# Patient Record
Sex: FEMALE | Race: WHITE | NOT HISPANIC OR LATINO | Employment: FULL TIME | URBAN - METROPOLITAN AREA
[De-identification: names, ages, dates, MRNs, and addresses within clinical notes are randomized per-mention and may not be internally consistent; named-entity substitution may affect disease eponyms.]

---

## 2019-12-23 ENCOUNTER — TRANSCRIBE ORDERS (OUTPATIENT)
Dept: ADMINISTRATIVE | Facility: HOSPITAL | Age: 44
End: 2019-12-23

## 2019-12-23 DIAGNOSIS — Z12.31 VISIT FOR SCREENING MAMMOGRAM: Primary | ICD-10-CM

## 2020-03-02 ENCOUNTER — APPOINTMENT (OUTPATIENT)
Dept: OTHER | Facility: HOSPITAL | Age: 45
End: 2020-03-02

## 2020-03-02 ENCOUNTER — HOSPITAL ENCOUNTER (OUTPATIENT)
Dept: MAMMOGRAPHY | Facility: HOSPITAL | Age: 45
Discharge: HOME | End: 2020-03-02
Admitting: FAMILY MEDICINE

## 2020-03-02 DIAGNOSIS — Z12.31 VISIT FOR SCREENING MAMMOGRAM: ICD-10-CM

## 2020-03-02 DIAGNOSIS — Z92.89 H/O MAMMOGRAM: ICD-10-CM

## 2020-03-02 PROCEDURE — 77067 SCR MAMMO BI INCL CAD: CPT

## 2020-03-02 PROCEDURE — 77063 BREAST TOMOSYNTHESIS BI: CPT | Performed by: RADIOLOGY

## 2020-03-02 PROCEDURE — 77067 SCR MAMMO BI INCL CAD: CPT | Performed by: RADIOLOGY

## 2020-03-02 PROCEDURE — 77063 BREAST TOMOSYNTHESIS BI: CPT

## 2020-03-17 ENCOUNTER — TRANSCRIBE ORDERS (OUTPATIENT)
Dept: MAMMOGRAPHY | Facility: HOSPITAL | Age: 45
End: 2020-03-17

## 2020-03-17 ENCOUNTER — HOSPITAL ENCOUNTER (OUTPATIENT)
Dept: ULTRASOUND IMAGING | Facility: HOSPITAL | Age: 45
Discharge: HOME | End: 2020-03-17

## 2020-03-17 ENCOUNTER — HOSPITAL ENCOUNTER (OUTPATIENT)
Dept: MAMMOGRAPHY | Facility: HOSPITAL | Age: 45
Discharge: HOME | End: 2020-03-17
Admitting: RADIOLOGY

## 2020-03-17 DIAGNOSIS — R92.8 ABNORMAL MAMMOGRAM: ICD-10-CM

## 2020-03-17 DIAGNOSIS — R92.8 ABNORMAL MAMMOGRAM: Primary | ICD-10-CM

## 2020-03-17 PROCEDURE — 77062 BREAST TOMOSYNTHESIS BI: CPT | Performed by: RADIOLOGY

## 2020-03-17 PROCEDURE — G0279 TOMOSYNTHESIS, MAMMO: HCPCS

## 2020-03-17 PROCEDURE — 77066 DX MAMMO INCL CAD BI: CPT

## 2020-03-17 PROCEDURE — 76641 ULTRASOUND BREAST COMPLETE: CPT

## 2020-03-17 PROCEDURE — 76641 ULTRASOUND BREAST COMPLETE: CPT | Performed by: RADIOLOGY

## 2020-03-17 PROCEDURE — 77066 DX MAMMO INCL CAD BI: CPT | Performed by: RADIOLOGY

## 2020-09-18 ENCOUNTER — HOSPITAL ENCOUNTER (OUTPATIENT)
Dept: MAMMOGRAPHY | Facility: HOSPITAL | Age: 45
Discharge: HOME | End: 2020-09-18

## 2020-09-18 ENCOUNTER — HOSPITAL ENCOUNTER (OUTPATIENT)
Dept: ULTRASOUND IMAGING | Facility: HOSPITAL | Age: 45
Discharge: HOME | End: 2020-09-18

## 2020-09-18 DIAGNOSIS — R92.8 ABNORMAL MAMMOGRAM: ICD-10-CM

## 2020-09-18 PROCEDURE — 76642 ULTRASOUND BREAST LIMITED: CPT | Performed by: RADIOLOGY

## 2020-09-18 PROCEDURE — 76642 ULTRASOUND BREAST LIMITED: CPT

## 2020-09-18 PROCEDURE — 77065 DX MAMMO INCL CAD UNI: CPT

## 2020-09-18 PROCEDURE — 77065 DX MAMMO INCL CAD UNI: CPT | Performed by: RADIOLOGY

## 2020-09-22 ENCOUNTER — TRANSCRIBE ORDERS (OUTPATIENT)
Dept: ADMINISTRATIVE | Facility: HOSPITAL | Age: 45
End: 2020-09-22

## 2020-09-22 DIAGNOSIS — Z12.31 VISIT FOR SCREENING MAMMOGRAM: Primary | ICD-10-CM

## 2021-03-18 ENCOUNTER — HOSPITAL ENCOUNTER (OUTPATIENT)
Dept: MAMMOGRAPHY | Facility: HOSPITAL | Age: 46
Discharge: HOME | End: 2021-03-18
Admitting: FAMILY MEDICINE

## 2021-03-18 DIAGNOSIS — Z12.31 VISIT FOR SCREENING MAMMOGRAM: ICD-10-CM

## 2021-03-18 PROCEDURE — 77063 BREAST TOMOSYNTHESIS BI: CPT

## 2021-03-18 PROCEDURE — 77063 BREAST TOMOSYNTHESIS BI: CPT | Performed by: RADIOLOGY

## 2021-03-18 PROCEDURE — 77067 SCR MAMMO BI INCL CAD: CPT

## 2021-03-18 PROCEDURE — 77067 SCR MAMMO BI INCL CAD: CPT | Performed by: RADIOLOGY

## 2022-02-07 ENCOUNTER — TRANSCRIBE ORDERS (OUTPATIENT)
Dept: ADMINISTRATIVE | Facility: HOSPITAL | Age: 47
End: 2022-02-07

## 2022-02-07 DIAGNOSIS — Z12.31 SCREENING MAMMOGRAM FOR BREAST CANCER: Primary | ICD-10-CM

## 2022-03-22 ENCOUNTER — HOSPITAL ENCOUNTER (OUTPATIENT)
Dept: MAMMOGRAPHY | Facility: HOSPITAL | Age: 47
Discharge: HOME | End: 2022-03-22
Admitting: FAMILY MEDICINE

## 2022-03-22 DIAGNOSIS — Z12.31 SCREENING MAMMOGRAM FOR BREAST CANCER: ICD-10-CM

## 2022-03-22 PROCEDURE — 77067 SCR MAMMO BI INCL CAD: CPT | Performed by: RADIOLOGY

## 2022-03-22 PROCEDURE — 77063 BREAST TOMOSYNTHESIS BI: CPT

## 2022-03-22 PROCEDURE — 77063 BREAST TOMOSYNTHESIS BI: CPT | Performed by: RADIOLOGY

## 2022-03-22 PROCEDURE — 77067 SCR MAMMO BI INCL CAD: CPT

## 2023-02-14 ENCOUNTER — TRANSCRIBE ORDERS (OUTPATIENT)
Dept: ADMINISTRATIVE | Facility: HOSPITAL | Age: 48
End: 2023-02-14
Payer: OTHER GOVERNMENT

## 2023-02-14 DIAGNOSIS — Z12.31 VISIT FOR SCREENING MAMMOGRAM: Primary | ICD-10-CM

## 2023-03-23 ENCOUNTER — HOSPITAL ENCOUNTER (OUTPATIENT)
Dept: MAMMOGRAPHY | Facility: HOSPITAL | Age: 48
Discharge: HOME | End: 2023-03-23
Admitting: FAMILY MEDICINE
Payer: OTHER GOVERNMENT

## 2023-03-23 DIAGNOSIS — Z12.31 VISIT FOR SCREENING MAMMOGRAM: ICD-10-CM

## 2023-03-23 PROCEDURE — 77063 BREAST TOMOSYNTHESIS BI: CPT

## 2023-03-23 PROCEDURE — 77063 BREAST TOMOSYNTHESIS BI: CPT | Performed by: RADIOLOGY

## 2023-03-23 PROCEDURE — 77067 SCR MAMMO BI INCL CAD: CPT

## 2023-03-23 PROCEDURE — 77067 SCR MAMMO BI INCL CAD: CPT | Performed by: RADIOLOGY

## 2023-10-09 ENCOUNTER — TRANSCRIBE ORDERS (OUTPATIENT)
Dept: ADMINISTRATIVE | Facility: HOSPITAL | Age: 48
End: 2023-10-09
Payer: OTHER GOVERNMENT

## 2023-10-09 DIAGNOSIS — N93.9 UTERINE BLEEDING: Primary | ICD-10-CM

## 2023-11-04 ENCOUNTER — HOSPITAL ENCOUNTER (OUTPATIENT)
Dept: ULTRASOUND IMAGING | Facility: HOSPITAL | Age: 48
Discharge: HOME | End: 2023-11-04
Payer: OTHER GOVERNMENT

## 2023-11-04 DIAGNOSIS — N93.9 UTERINE BLEEDING: ICD-10-CM

## 2023-11-04 PROCEDURE — 76830 TRANSVAGINAL US NON-OB: CPT

## 2023-11-08 ENCOUNTER — TELEPHONE (OUTPATIENT)
Dept: OBSTETRICS AND GYNECOLOGY | Facility: CLINIC | Age: 48
End: 2023-11-08
Payer: OTHER GOVERNMENT

## 2023-11-08 NOTE — TELEPHONE ENCOUNTER
I was instructed to schedule an us w/ a new gyn appt for fibroids. Pt wants to speak with a nurse because she stated that she already had us and wants to discuss needing a second one

## 2023-11-08 NOTE — TELEPHONE ENCOUNTER
Per MD, no repeat U/S needed. Patient was scheduled prior to obtaining U/S on 11/4. MD states what was already done is sufficient

## 2023-11-17 ENCOUNTER — TELEPHONE (OUTPATIENT)
Dept: OBSTETRICS AND GYNECOLOGY | Facility: CLINIC | Age: 48
End: 2023-11-17
Payer: OTHER GOVERNMENT

## 2023-11-20 ENCOUNTER — OFFICE VISIT (OUTPATIENT)
Dept: OBSTETRICS AND GYNECOLOGY | Facility: CLINIC | Age: 48
End: 2023-11-20
Payer: OTHER GOVERNMENT

## 2023-11-20 VITALS
HEIGHT: 65 IN | BODY MASS INDEX: 29.62 KG/M2 | SYSTOLIC BLOOD PRESSURE: 132 MMHG | WEIGHT: 177.8 LBS | DIASTOLIC BLOOD PRESSURE: 86 MMHG

## 2023-11-20 DIAGNOSIS — D25.1 INTRAMURAL LEIOMYOMA OF UTERUS: ICD-10-CM

## 2023-11-20 DIAGNOSIS — N93.9 ABNORMAL UTERINE BLEEDING (AUB): Primary | ICD-10-CM

## 2023-11-20 RX ORDER — EPINEPHRINE 0.3 MG/.3ML
INJECTION SUBCUTANEOUS
COMMUNITY
Start: 2023-08-10

## 2023-11-20 RX ORDER — LEVOCETIRIZINE DIHYDROCHLORIDE 5 MG/1
TABLET, FILM COATED ORAL
COMMUNITY
Start: 2023-11-08

## 2023-11-20 NOTE — ASSESSMENT & PLAN NOTE
We discussed that fibroids are benign tumors of the uterus. Most of the time women have them and don't even know they do. Our general thought is that if they aren't bothering her, we won't bother them. However, if they start to cause issues with bleeding, pain, or a bulky abdomen we can explore treatment options. We discussed that in women who wish to retain their ability to get pregnant treatment options include: hormonal contraception, hormonal IUD, DepoProvera, TXA, Myomectomy (abdominal or hysteroscopic depending on location). In those women who no longer desire fertility treatment options include the above, but also a uterine fibroid embolization, or hysterectomy. She does not desire to maintain her fertility.     After review of the options she is leaning toward observation as she is not bothered by her fibroids other than that one bleeding episode.     We had an extensive discussion about the fact that one cannot reliably distinguish a fibroid from leiomyosarcoma based on any imaging modality. Fibroids are very common and leiomyosarcomas of the uterus are exceedingly rare. Rapid enlargement of a fibroid is not a reliable sign of it being a leiomyosarcoma. Most uterine tumors that enlarge rapidly are fibroids, because fibroids are just that much more common than leiomyosarcomas.     Given the plan for observation, we will plan on getting an ultrasound in 4 to 6 months.

## 2023-11-20 NOTE — ASSESSMENT & PLAN NOTE
Counseled on the need for an endometrial biopsy.  Patient is currently on her period, recommend returning in 1 to 2 weeks for endometrial biopsy while not on her cycle.

## 2023-11-20 NOTE — PROGRESS NOTES
"          Chief Complaint   Patient presents with    Menorrhagia         Subjective   HPI  Claudinelinda Coello is a 48 y.o. female, , Patient's last menstrual period was 2023 (exact date). She presents for initial evaluation of menorrhagia with regular cycles. She reports approximately 2 months ago, she had a heavy menstrual wherein she saturated a super tampon hourly for 3 days (with clots). Her menstruals since that instance that started in September have been normal flow. Her typical menstruals are moderate in flow (with clots) without dysmenorrhea. They typically last 4-6 days. She has begun feeling intermittent hot flashes.     Her PCP performed an U/S and labwork, considering patient's mother's history of uterine cancer (dx in her mid-to-late 40s); labwork showed anemia, which she is taking iron for. The patient reports additional symptoms as  nothing .      US was not done today, but she had an U/S 2023 which showed \"normal uterine parenchymal echogenicity with a 3.4 cm right anterior fundal intramural fibroid. The endometrial stripe measures 6 mm, normal\"    Thromboembolic Disease: none  History of hypertension: no  History of migraines: no  Tobacco Usage?: No     Additional OB/GYN History   Last Pap:   Last Completed Pap Smear            PAP SMEAR (Every 3 Years) Next due on 3/22/2025      2022  Done - normal, per patient                      Current Outpatient Medications:     ALLERGY SERUM INJECTION, Inject  under the skin into the appropriate area as directed 1 (One) Time., Disp: , Rfl:     EPINEPHrine (EPIPEN) 0.3 MG/0.3ML solution auto-injector injection, , Disp: , Rfl:     Ferrous Sulfate (IRON PO), Take  by mouth., Disp: , Rfl:     levocetirizine (XYZAL) 5 MG tablet, , Disp: , Rfl:      Past Medical History:   Diagnosis Date    Anemia 10/2023    Basal cell carcinoma     skin spots    History of varicella as a child     Lumbar back pain     Ovarian cyst     Discovered " "fluid filled cyst during 1st trimester exam    Uterine fibroid 11/04/2023    3.4 cm right anterior fundal intramural fibroid        Past Surgical History:   Procedure Laterality Date    MANDIBLE SURGERY  2016    and upper jaw    TUBAL ABDOMINAL LIGATION Bilateral     WISDOM TOOTH EXTRACTION           The additional following portions of the patient's history were reviewed and updated as appropriate: allergies, current medications, past family history, past medical history, past social history, past surgical history, and problem list.    Review of Systems   Constitutional: Negative.    HENT: Negative.     Eyes: Negative.    Respiratory: Negative.     Cardiovascular: Negative.    Gastrointestinal: Negative.    Endocrine: Negative.    Genitourinary:  Positive for menstrual problem.   Musculoskeletal: Negative.    Skin: Negative.    Allergic/Immunologic: Negative.    Neurological: Negative.    Hematological: Negative.    Psychiatric/Behavioral: Negative.         I have reviewed and agree with the HPI, ROS, and historical information as entered above. Db Wilkinson MD     Objective   /86 (BP Location: Right arm, Patient Position: Sitting, Cuff Size: Adult)   Ht 165.1 cm (65\")   Wt 80.6 kg (177 lb 12.8 oz)   LMP 11/20/2023 (Exact Date)   BMI 29.59 kg/m²     Physical Exam  Constitutional:       Appearance: Normal appearance.   HENT:      Head: Normocephalic and atraumatic.   Pulmonary:      Effort: Pulmonary effort is normal.   Neurological:      General: No focal deficit present.      Mental Status: She is alert.   Psychiatric:         Mood and Affect: Mood normal.         Assessment & Plan          Problem List Items Addressed This Visit       Intramural leiomyoma of uterus    Current Assessment & Plan     We discussed that fibroids are benign tumors of the uterus. Most of the time women have them and don't even know they do. Our general thought is that if they aren't bothering her, we won't bother them. " However, if they start to cause issues with bleeding, pain, or a bulky abdomen we can explore treatment options. We discussed that in women who wish to retain their ability to get pregnant treatment options include: hormonal contraception, hormonal IUD, DepoProvera, TXA, Myomectomy (abdominal or hysteroscopic depending on location). In those women who no longer desire fertility treatment options include the above, but also a uterine fibroid embolization, or hysterectomy. She does not desire to maintain her fertility.     After review of the options she is leaning toward observation as she is not bothered by her fibroids other than that one bleeding episode.     We had an extensive discussion about the fact that one cannot reliably distinguish a fibroid from leiomyosarcoma based on any imaging modality. Fibroids are very common and leiomyosarcomas of the uterus are exceedingly rare. Rapid enlargement of a fibroid is not a reliable sign of it being a leiomyosarcoma. Most uterine tumors that enlarge rapidly are fibroids, because fibroids are just that much more common than leiomyosarcomas.     Given the plan for observation, we will plan on getting an ultrasound in 4 to 6 months.         Relevant Orders    US Non-ob Transvaginal    Abnormal uterine bleeding (AUB) - Primary    Current Assessment & Plan     Counseled on the need for an endometrial biopsy.  Patient is currently on her period, recommend returning in 1 to 2 weeks for endometrial biopsy while not on her cycle.         Relevant Orders    US Non-ob Transvaginal            Db Wilkinson MD  11/20/2023

## 2023-11-27 ENCOUNTER — PROCEDURE VISIT (OUTPATIENT)
Dept: OBSTETRICS AND GYNECOLOGY | Facility: CLINIC | Age: 48
End: 2023-11-27
Payer: OTHER GOVERNMENT

## 2023-11-27 VITALS
DIASTOLIC BLOOD PRESSURE: 80 MMHG | BODY MASS INDEX: 29.59 KG/M2 | SYSTOLIC BLOOD PRESSURE: 118 MMHG | HEIGHT: 65 IN | WEIGHT: 177.6 LBS

## 2023-11-27 DIAGNOSIS — N93.9 ABNORMAL UTERINE BLEEDING (AUB): ICD-10-CM

## 2023-11-27 DIAGNOSIS — Z76.89 ENCOUNTER FOR BIOPSY: Primary | ICD-10-CM

## 2023-11-27 DIAGNOSIS — D25.1 INTRAMURAL LEIOMYOMA OF UTERUS: ICD-10-CM

## 2023-11-27 LAB
B-HCG UR QL: NEGATIVE
EXPIRATION DATE: NORMAL
INTERNAL NEGATIVE CONTROL: NORMAL
INTERNAL POSITIVE CONTROL: NORMAL
Lab: NORMAL

## 2023-11-27 NOTE — PROGRESS NOTES
Gynecologic Procedure Note        Endometrial Biopsy with Dilation     Indications: Claudine Coello is a 48 y.o. , who presents today for endometrial biopsy.  The patient was noted to have Menorrhagia.  Her LMP is Patient's last menstrual period was 2023 (exact date). . After being presented with the risk, benefits, and specific detail of the procedure, the patient wished to proceed.  Written consent was obtained from patient.   Urine pregnancy test was Negative. Patient does not have an allergy to betadine or shellfish.     Procedure Details     Time out: immediate members of the procedure team and patient agree to the following: correct patient, correct site, correct procedure to be performed. Db Wilkinson MD     The patient was placed on the table in the dorsal lithotomy position.  She was draped in the appropriate manner.  A speculum was placed in the vagina.  The cervix was visualized and prepped with Betadine.  A tenaculum was placed on the anterior lip of the cervix for traction.  A small plastic 5 mm Pipelle syringe curette was inserted into the cervical canal.  This was unable to be advanced past the internal os.  Despite use of Newell dilators, I was unable to safely dilate the cervix to complete the procedure.  The procedure was aborted at this time.  The patient tolerated the procedure very well and she reported mild cramping.  The tenaculum was removed from the cervix and the speculum was removed.  Bimanual exam revealed a 1 cm dilated external os, but a firm, stenotic, cervical canal.         Complications: Failed procedure.     Procedures    Review of Systems   Constitutional: Negative.    HENT: Negative.     Eyes: Negative.    Respiratory: Negative.     Cardiovascular: Negative.    Gastrointestinal: Negative.    Endocrine: Negative.    Genitourinary:  Positive for menstrual problem and vaginal bleeding.   Musculoskeletal: Negative.    Skin: Negative.    Allergic/Immunologic:  "Negative.    Neurological: Negative.    Hematological: Negative.    Psychiatric/Behavioral: Negative.       /80   Ht 165.1 cm (65\")   Wt 80.6 kg (177 lb 9.6 oz)   LMP 11/20/2023 (Exact Date)   BMI 29.55 kg/m²       Plan:  Orders Placed This Encounter   Procedures    POC Pregnancy, Urine     Order Specific Question:   Release to patient     Answer:   Routine Release [[id number]]       Problem List Items Addressed This Visit       Intramural leiomyoma of uterus    Abnormal uterine bleeding (AUB)     Other Visit Diagnoses       Encounter for biopsy    -  Primary    Relevant Orders    POC Pregnancy, Urine (Completed)                Instructions  Given the patient's only had 1 episode of abnormal bleeding, it is reasonable to continue to observe at this time.  I offered her the option of upfront D&C hysteroscopy operating room, likely with ultrasound guidance.  We came to the mutual decision to observe at this time.  She will let us know if her abnormal bleeding recurs.  Otherwise, we will plan on a repeat ultrasound in 4 months as previously planned.  Patient instructed to call the office if develops a fever of 100.4 or greater, vaginal bleeding heavier than a period, foul vaginal discharge or pain.     Db iWlkinson MD  11/27/2023  "

## 2024-02-07 ENCOUNTER — TRANSCRIBE ORDERS (OUTPATIENT)
Dept: ADMINISTRATIVE | Facility: HOSPITAL | Age: 49
End: 2024-02-07
Payer: OTHER GOVERNMENT

## 2024-02-07 DIAGNOSIS — Z12.31 VISIT FOR SCREENING MAMMOGRAM: Primary | ICD-10-CM

## 2024-03-25 ENCOUNTER — HOSPITAL ENCOUNTER (OUTPATIENT)
Dept: MAMMOGRAPHY | Facility: HOSPITAL | Age: 49
Discharge: HOME | End: 2024-03-25
Admitting: FAMILY MEDICINE
Payer: OTHER GOVERNMENT

## 2024-03-25 DIAGNOSIS — Z12.31 VISIT FOR SCREENING MAMMOGRAM: ICD-10-CM

## 2024-03-25 PROCEDURE — 77067 SCR MAMMO BI INCL CAD: CPT

## 2024-03-25 PROCEDURE — 77063 BREAST TOMOSYNTHESIS BI: CPT

## 2024-04-26 ENCOUNTER — HOSPITAL ENCOUNTER (OUTPATIENT)
Dept: MAMMOGRAPHY | Facility: HOSPITAL | Age: 49
Discharge: HOME | End: 2024-04-26
Admitting: RADIOLOGY
Payer: OTHER GOVERNMENT

## 2024-04-26 DIAGNOSIS — R92.8 ABNORMAL MAMMOGRAM: ICD-10-CM

## 2024-04-26 PROCEDURE — G0279 TOMOSYNTHESIS, MAMMO: HCPCS

## 2024-04-26 PROCEDURE — 77066 DX MAMMO INCL CAD BI: CPT

## 2024-05-24 ENCOUNTER — OFFICE VISIT (OUTPATIENT)
Dept: OBSTETRICS AND GYNECOLOGY | Facility: CLINIC | Age: 49
End: 2024-05-24
Payer: OTHER GOVERNMENT

## 2024-05-24 VITALS
DIASTOLIC BLOOD PRESSURE: 84 MMHG | BODY MASS INDEX: 30.49 KG/M2 | HEIGHT: 65 IN | WEIGHT: 183 LBS | SYSTOLIC BLOOD PRESSURE: 138 MMHG

## 2024-05-24 DIAGNOSIS — N83.202 LEFT OVARIAN CYST: ICD-10-CM

## 2024-05-24 DIAGNOSIS — D25.1 INTRAMURAL LEIOMYOMA OF UTERUS: Primary | ICD-10-CM

## 2024-05-24 NOTE — PROGRESS NOTES
Chief Complaint   Patient presents with    Follow-up    Fibroids         Subjective   HPI  Claudine Coello is a 49 y.o. female, , who presents for follow up evaluation of fibroid and AUB.    Her last LMP was Patient's last menstrual period was 2024 (exact date)..  She was last seen 6 month(s) ago. At that time the plan was expectant management for management of the fibroid(s) and AUB. She normally has her periods regularly every 28 days, lasting 5 days. She reports normal period flow is moderate on day 3 and 4.     Since her last visit her symptoms have remained unchanged. She reports she had normal periods since her November appointment up until 24. On 24 she started bleeding and it lasted until yesterday 24. She reports the flow was spotty the entire time. The patient reports additional symptoms as none.      Did the patient have u/s today? Yes    Tobacco Usage?: No    Additional OB/GYN History   Last Pap : 3/20/22  Last Completed Pap Smear            PAP SMEAR (Every 3 Years) Next due on 3/22/2025      2022  Done - normal, per patient                      Current Outpatient Medications:     ALLERGY SERUM INJECTION, Inject  under the skin into the appropriate area as directed 1 (One) Time., Disp: , Rfl:     EPINEPHrine (EPIPEN) 0.3 MG/0.3ML solution auto-injector injection, , Disp: , Rfl:     levocetirizine (XYZAL) 5 MG tablet, , Disp: , Rfl:      Past Medical History:   Diagnosis Date    Anemia 10/2023    Basal cell carcinoma     skin spots    History of varicella as a child     Lumbar back pain     Multiple gestation     Ovarian cyst     Discovered fluid filled cyst during 1st trimester exam    Urinary tract infection 2004    Twice in     Uterine fibroid 2023    3.4 cm right anterior fundal intramural fibroid    Varicella     As a child        Past Surgical History:   Procedure Laterality Date    MANDIBLE SURGERY  2016    and upper jaw    TUBAL  "ABDOMINAL LIGATION Bilateral     WISDOM TOOTH EXTRACTION         The additional following portions of the patient's history were reviewed and updated as appropriate: allergies, current medications, past family history, past medical history, past social history, past surgical history, and problem list.    Review of Systems   Constitutional: Negative.    HENT: Negative.     Eyes: Negative.    Respiratory: Negative.     Cardiovascular: Negative.    Gastrointestinal: Negative.    Endocrine: Negative.    Genitourinary:  Positive for menstrual problem.   Musculoskeletal: Negative.    Skin: Negative.    Allergic/Immunologic: Negative.    Neurological: Negative.    Hematological: Negative.    Psychiatric/Behavioral: Negative.       All other systems reviewed and are negative.     I have reviewed and agree with the HPI, ROS, and historical information as entered above. Db Wilkinson MD     /84   Ht 165.1 cm (65\")   Wt 83 kg (183 lb)   LMP 05/13/2024 (Exact Date)   BMI 30.45 kg/m²     Physical Exam  Constitutional:       Appearance: Normal appearance.   HENT:      Head: Normocephalic and atraumatic.   Pulmonary:      Effort: Pulmonary effort is normal.   Neurological:      General: No focal deficit present.      Mental Status: She is alert.   Psychiatric:         Mood and Affect: Mood normal.         Assessment & Plan     Assessment and Plan    Problem List Items Addressed This Visit       Intramural leiomyoma of uterus - Primary    Current Assessment & Plan     Significantly smaller. Continue expectant management.          Left ovarian cyst    Current Assessment & Plan     Simplecyst. Follow up 1 year per patient request instead of 6 months.            Continue expectant management. Reviewed menopausal transition  Call with heavy bleeding    Return in about 1 year (around 5/24/2025) for GYN Follow up with [facility].       Db Wilkinson MD  05/24/2024   "

## 2025-01-14 ENCOUNTER — TRANSCRIBE ORDERS (OUTPATIENT)
Dept: ADMINISTRATIVE | Facility: HOSPITAL | Age: 50
End: 2025-01-14
Payer: OTHER GOVERNMENT

## 2025-01-14 DIAGNOSIS — M25.561 CHRONIC PAIN OF RIGHT KNEE: Primary | ICD-10-CM

## 2025-01-14 DIAGNOSIS — G89.29 CHRONIC PAIN OF RIGHT KNEE: Primary | ICD-10-CM

## 2025-02-12 ENCOUNTER — TRANSCRIBE ORDERS (OUTPATIENT)
Dept: ADMINISTRATIVE | Facility: HOSPITAL | Age: 50
End: 2025-02-12
Payer: OTHER GOVERNMENT

## 2025-02-12 DIAGNOSIS — Z12.31 VISIT FOR SCREENING MAMMOGRAM: Primary | ICD-10-CM

## 2025-02-17 ENCOUNTER — HOSPITAL ENCOUNTER (OUTPATIENT)
Dept: MRI IMAGING | Facility: HOSPITAL | Age: 50
Discharge: HOME | End: 2025-02-17
Admitting: NURSE PRACTITIONER
Payer: OTHER GOVERNMENT

## 2025-02-17 DIAGNOSIS — G89.29 CHRONIC PAIN OF RIGHT KNEE: ICD-10-CM

## 2025-02-17 DIAGNOSIS — M25.561 CHRONIC PAIN OF RIGHT KNEE: ICD-10-CM

## 2025-02-17 PROCEDURE — 73721 MRI JNT OF LWR EXTRE W/O DYE: CPT

## 2025-02-24 ENCOUNTER — OFFICE VISIT (OUTPATIENT)
Dept: ORTHOPEDIC SURGERY | Facility: CLINIC | Age: 50
End: 2025-02-24
Payer: OTHER GOVERNMENT

## 2025-02-24 VITALS
WEIGHT: 174.2 LBS | BODY MASS INDEX: 29.02 KG/M2 | DIASTOLIC BLOOD PRESSURE: 70 MMHG | HEIGHT: 65 IN | SYSTOLIC BLOOD PRESSURE: 126 MMHG

## 2025-02-24 DIAGNOSIS — M22.41 CHONDROMALACIA OF RIGHT PATELLA: Primary | ICD-10-CM

## 2025-02-24 PROCEDURE — 99203 OFFICE O/P NEW LOW 30 MIN: CPT | Performed by: ORTHOPAEDIC SURGERY

## 2025-02-24 RX ORDER — FERROUS SULFATE 325(65) MG
1 TABLET ORAL
COMMUNITY
Start: 2024-12-09

## 2025-02-24 NOTE — PROGRESS NOTES
[facility] Note    Subjective     Chief Complaint   Patient presents with   • Right Knee - Pain        HPI    Claudine Coello is a 49 y.o. female who presents with new problem of: right knee pain.  Onset: twisting injury. The issue has been ongoing for 2 month(s). Pain is a 4/10 on the pain scale. Pain is described as throbbing. Associated symptoms include pain, swelling, popping, grinding, and stiffness. The pain is worse with walking, climbing stairs, and leisure; resting, sitting, ice, and pain medication and/or NSAID improve the pain. Previous treatments have included: NSAIDS.  She was getting into a seat on a van, and twisted her knee in the process, and had throbbing afterwards.  No swelling.    I have reviewed the following portions of the patient's history and agree with: History of Present Illness and Review of Systems    Patient Active Problem List   Diagnosis   • Intramural leiomyoma of uterus   • Abnormal uterine bleeding (AUB)   • Left ovarian cyst     Past Medical History:   Diagnosis Date   • Anemia 10/2023   • Basal cell carcinoma     skin spots   • History of varicella as a child    • Lumbar back pain    • Lumbosacral disc disease    • Multiple gestation    • Ovarian cyst     Discovered fluid filled cyst during 1st trimester exam   • Urinary tract infection 2004    Twice in    • Uterine fibroid 2023    3.4 cm right anterior fundal intramural fibroid   • Varicella     As a child      Past Surgical History:   Procedure Laterality Date   • MANDIBLE SURGERY      and upper jaw   • TUBAL ABDOMINAL LIGATION Bilateral    • WISDOM TOOTH EXTRACTION        Family History   Problem Relation Age of Onset   • Hypertension Father    • Coronary artery disease Father         Twice heart surgery,  of heart attack    • Heart attack Father    • Diabetes type II Father    • Hypertension Mother    • Coronary artery disease Mother         Stints procedure    •  Heart attack Mother    • Uterine cancer Mother         Hysterectomy    • Diabetes type II Mother    • Colon polyps Brother    • Breast cancer Neg Hx    • Ovarian cancer Neg Hx    • Colon cancer Neg Hx      Social History     Socioeconomic History   • Marital status:    Tobacco Use   • Smoking status: Former     Current packs/day: 0.00     Types: Cigarettes     Start date: 1994     Quit date: 1998     Years since quittin.2   • Smokeless tobacco: Never   • Tobacco comments:     social   Vaping Use   • Vaping status: Never Used   Substance and Sexual Activity   • Alcohol use: Yes     Alcohol/week: 1.0 standard drink of alcohol     Types: 1 Glasses of wine per week   • Drug use: Never   • Sexual activity: Yes     Partners: Male     Birth control/protection: Tubal ligation      Current Outpatient Medications on File Prior to Visit   Medication Sig Dispense Refill   • ferrous sulfate 325 (65 FE) MG tablet Take 1 tablet by mouth Every Other Day.     • ALLERGY SERUM INJECTION Inject  under the skin into the appropriate area as directed 1 (One) Time.     • EPINEPHrine (EPIPEN) 0.3 MG/0.3ML solution auto-injector injection      • levocetirizine (XYZAL) 5 MG tablet        No current facility-administered medications on file prior to visit.      No Known Allergies     Review of Systems   Constitutional:  Negative for activity change, appetite change, chills, diaphoresis, fatigue, fever and unexpected weight change.   HENT:  Negative for congestion, dental problem, drooling, ear discharge, ear pain, facial swelling, hearing loss, mouth sores, nosebleeds, postnasal drip, rhinorrhea, sinus pressure, sneezing, sore throat, tinnitus, trouble swallowing and voice change.    Eyes:  Negative for photophobia, pain, discharge, redness, itching and visual disturbance.   Respiratory:  Negative for apnea, cough, choking, chest tightness, shortness of breath, wheezing and stridor.    Cardiovascular:  Negative for  "chest pain, palpitations and leg swelling.   Gastrointestinal:  Negative for abdominal distention, abdominal pain, anal bleeding, blood in stool, constipation, diarrhea, nausea, rectal pain and vomiting.   Endocrine: Negative for cold intolerance, heat intolerance, polydipsia, polyphagia and polyuria.   Genitourinary:  Negative for decreased urine volume, difficulty urinating, dysuria, enuresis, flank pain, frequency, genital sores, hematuria and urgency.   Musculoskeletal:  Positive for arthralgias. Negative for back pain, gait problem, joint swelling, myalgias, neck pain and neck stiffness.   Skin:  Negative for color change, pallor, rash and wound.   Allergic/Immunologic: Negative for environmental allergies, food allergies and immunocompromised state.   Neurological:  Negative for dizziness, tremors, seizures, syncope, facial asymmetry, speech difficulty, weakness, light-headedness, numbness and headaches.   Hematological:  Negative for adenopathy. Does not bruise/bleed easily.   Psychiatric/Behavioral:  Negative for agitation, behavioral problems, confusion, decreased concentration, dysphoric mood, hallucinations, self-injury, sleep disturbance and suicidal ideas. The patient is not nervous/anxious and is not hyperactive.         Objective      Physical Exam  /70   Ht 165.5 cm (65.16\")   Wt 79 kg (174 lb 3.2 oz)   BMI 28.85 kg/m²     Body mass index is 28.85 kg/m².  BMI is >= 30 and <35. (Class 1 Obesity). The following options were offered after discussion;: referral to primary care        General:   Mental Status:  Alert   Appearance: Cooperative, in no acute distress   Build and Nutrition: Well-nourished well-developed female   Orientation: Alert and oriented to person, place and time   Posture: Normal   Gait: Nonantalgic    Integument:   Right knee: No skin lesions, no rash, no ecchymosis    Neurologic:   Sensation:    Right foot: Intact to light touch on the dorsal and plantar " aspect   Motor:  Right lower extremity: 5/5 quadriceps, hamstrings, ankle dorsiflexors, and ankle plantar flexors    Vascular:   Right lower extremity: 2+ dorsalis pedis pulse, prompt capillary refill    Lower Extremities:   Right Knee:    Tenderness:  No medial/lateral joint line tenderness    Effusion:  None    Swelling:  None    Crepitus:  Positive    Atrophy:  None    Range of motion:  Extension: 0°       Flexion: 130°  Instability:  No varus laxity, no valgus laxity, negative anterior drawer  Deformities:  None      Imaging/Studies      Imaging Results (Last 24 Hours)       Procedure Component Value Units Date/Time    XR Knee 4+ View Right [[id number]] Resulted: 02/24/25 1558     Updated: 02/24/25 1558    Narrative:      Right Knee Radiographs  Indication: right knee pain  Views: Standing AP's and skiers of both knees, with lateral and sunrise   views of the right knee    Comparison: no prior studies available    Findings:    No acute bony abnormalities.  Good alignment.  No unusual bony features.            MRI KNEE RIGHT  WO CONTRAST     Date of Exam: 2/17/2025 2:19 PM EST     Indication: M25.561.     Comparison: None available.     Technique:  Routine multiplanar/multisequence images of the right knee were obtained without contrast administration.        Findings:  The medial and lateral menisci are normal. The medial and lateral supporting structures are normal. The articular cartilage of the medial and lateral femorotibial compartments appears grossly preserved.     The anterior cruciate ligament and posterior cruciate ligament are normal.     The quadriceps tendon and patellar tendon are normal. No edema of the anterior knee fat pads. There is chondromalacia and areas of low to moderate-grade chondral loss and fissuring of the patellar articular cartilage. There is chondromalacia and   fissuring of the central trochlear cartilage.     There is a physiologic amount of knee joint fluid. No popliteal cyst.  The posterior knee neurovasculature is unremarkable. Normal appearance of the muscles and subcutaneous tissues.     No focal bony lesion. No fracture or bony contusion.     IMPRESSION:  Impression:  Moderate-grade chondral loss/chondral irregularities of the patellar articular cartilage. Chondromalacia and fissuring of the central trochlear cartilage.     Otherwise unremarkable knee MRI.           Electronically Signed: Bijan Blanton MD    2/17/2025 4:24 PM EST    Workstation ID: [id number]    I reviewed the above imaging, and agree with findings.    Assessment and Plan     Diagnoses and all orders for this visit:    1. Chondromalacia of right patella (Primary)  -     XR Knee 4+ View Right  -     Ambulatory Referral to Physical Therapy for Evaluation & Treatment        1. Chondromalacia of right patella          I reviewed my findings with the patient.  She does have chondromalacia seen on the MRI on her patella.  A course of physical therapy was recommended, referral provided.  I will see her back if she has any worsening or problems in the future.  No surgical intervention recommended.    Return if symptoms worsen or fail to improve.      Mark Berger MD  02/24/25  16:13 EST      Dictated Utilizing [employer]

## 2025-03-24 LAB
NCCN CRITERIA FLAG: NORMAL
TYRER CUZICK SCORE: 8.5

## 2025-04-08 ENCOUNTER — HOSPITAL ENCOUNTER (OUTPATIENT)
Dept: MAMMOGRAPHY | Facility: HOSPITAL | Age: 50
Discharge: HOME | End: 2025-04-08
Admitting: FAMILY MEDICINE
Payer: OTHER GOVERNMENT

## 2025-04-08 DIAGNOSIS — Z12.31 VISIT FOR SCREENING MAMMOGRAM: ICD-10-CM

## 2025-04-08 PROCEDURE — 77063 BREAST TOMOSYNTHESIS BI: CPT

## 2025-04-08 PROCEDURE — 77067 SCR MAMMO BI INCL CAD: CPT

## 2025-04-30 ENCOUNTER — OFFICE VISIT (OUTPATIENT)
Dept: ORTHOPEDIC SURGERY | Facility: CLINIC | Age: 50
End: 2025-04-30
Payer: OTHER GOVERNMENT

## 2025-04-30 VITALS
BODY MASS INDEX: 29.66 KG/M2 | HEIGHT: 65 IN | WEIGHT: 178 LBS | DIASTOLIC BLOOD PRESSURE: 70 MMHG | SYSTOLIC BLOOD PRESSURE: 126 MMHG

## 2025-04-30 DIAGNOSIS — M25.551 RIGHT HIP PAIN: Primary | ICD-10-CM

## 2025-04-30 PROCEDURE — 99214 OFFICE O/P EST MOD 30 MIN: CPT | Performed by: ORTHOPAEDIC SURGERY

## 2025-04-30 RX ORDER — CYCLOBENZAPRINE HCL 5 MG
TABLET ORAL
COMMUNITY
Start: 2025-04-08

## 2025-04-30 NOTE — PROGRESS NOTES
[facility] [place] [facility] Note    Subjective     Chief Complaint   Patient presents with    Right Hip - Initial Evaluation        HPI    Claudine Coello is a 50 y.o. female who presents with new problem of: right hip pain.  Onset: atraumatic and gradual in nature. The issue has been ongoing for 1 month(s). Pain is a 8/10 on the pain scale. Pain is described as burning. Associated symptoms include pain. The pain is worse with walking; resting improve the pain. Previous treatments have included: NSAIDS, physical therapy, and flexerill.  Pain is located on the lateral aspect of the hip, and can radiate down to the knee.  No numbness or tingling.  Known history of a back issue with herniated disc.    I have reviewed the following portions of the patient's history and agree with: History of Present Illness and Review of Systems    Patient Active Problem List   Diagnosis    Intramural leiomyoma of uterus    Abnormal uterine bleeding (AUB)    Left ovarian cyst     Past Medical History:   Diagnosis Date    Anemia 10/2023    Basal cell carcinoma     skin spots    History of varicella as a child     Lumbar back pain     Lumbosacral disc disease     Multiple gestation     Ovarian cyst     Discovered fluid filled cyst during 1st trimester exam    Urinary tract infection 2004    Twice in     Uterine fibroid 2023    3.4 cm right anterior fundal intramural fibroid    Varicella 1980    As a child      Past Surgical History:   Procedure Laterality Date    MANDIBLE SURGERY  2016    and upper jaw    TUBAL ABDOMINAL LIGATION Bilateral     WISDOM TOOTH EXTRACTION        Family History   Problem Relation Age of Onset    Hypertension Father     Coronary artery disease Father         Twice heart surgery,  of heart attack 2019    Heart attack Father     Diabetes type II Father     Diabetes Father     Hypertension Mother     Coronary artery disease Mother         Stints procedure     Heart attack Mother      Uterine cancer Mother         Hysterectomy 2003    Diabetes type II Mother     Diabetes Mother     Colon polyps Brother     Breast cancer Neg Hx     Ovarian cancer Neg Hx     Colon cancer Neg Hx      Social History     Socioeconomic History    Marital status:    Tobacco Use    Smoking status: Former     Current packs/day: 0.00     Types: Cigarettes     Start date: 1994     Quit date: 1998     Years since quittin.4    Smokeless tobacco: Never    Tobacco comments:     social   Vaping Use    Vaping status: Never Used   Substance and Sexual Activity    Alcohol use: Yes     Alcohol/week: 1.0 standard drink of alcohol     Types: 1 Glasses of wine per week    Drug use: Never    Sexual activity: Yes     Partners: Male     Birth control/protection: Tubal ligation      Current Outpatient Medications on File Prior to Visit   Medication Sig Dispense Refill    ALLERGY SERUM INJECTION Inject  under the skin into the appropriate area as directed 1 (One) Time.      cyclobenzaprine (FLEXERIL) 5 MG tablet take 1-2 tablet by mouth at bedtime      EPINEPHrine (EPIPEN) 0.3 MG/0.3ML solution auto-injector injection       ferrous sulfate 325 (65 FE) MG tablet Take 1 tablet by mouth Every Other Day.      levocetirizine (XYZAL) 5 MG tablet        No current facility-administered medications on file prior to visit.      No Known Allergies     Review of Systems   Constitutional:  Negative for activity change, appetite change, chills, diaphoresis, fatigue, fever and unexpected weight change.   HENT:  Negative for congestion, dental problem, drooling, ear discharge, ear pain, facial swelling, hearing loss, mouth sores, nosebleeds, postnasal drip, rhinorrhea, sinus pressure, sneezing, sore throat, tinnitus, trouble swallowing and voice change.    Eyes:  Negative for photophobia, pain, discharge, redness, itching and visual disturbance.   Respiratory:  Negative for apnea, cough, choking, chest tightness, shortness of  "breath, wheezing and stridor.    Cardiovascular:  Negative for chest pain, palpitations and leg swelling.   Gastrointestinal:  Negative for abdominal distention, abdominal pain, anal bleeding, blood in stool, constipation, diarrhea, nausea, rectal pain and vomiting.   Endocrine: Negative for cold intolerance, heat intolerance, polydipsia, polyphagia and polyuria.   Genitourinary:  Negative for decreased urine volume, difficulty urinating, dysuria, enuresis, flank pain, frequency, genital sores, hematuria and urgency.   Musculoskeletal:  Positive for arthralgias. Negative for back pain, gait problem, joint swelling, myalgias, neck pain and neck stiffness.   Skin:  Negative for color change, pallor, rash and wound.   Allergic/Immunologic: Negative for environmental allergies, food allergies and immunocompromised state.   Neurological:  Negative for dizziness, tremors, seizures, syncope, facial asymmetry, speech difficulty, weakness, light-headedness, numbness and headaches.   Hematological:  Negative for adenopathy. Does not bruise/bleed easily.   Psychiatric/Behavioral:  Negative for agitation, behavioral problems, confusion, decreased concentration, dysphoric mood, hallucinations, self-injury, sleep disturbance and suicidal ideas. The patient is not nervous/anxious and is not hyperactive.         Objective      Physical Exam  /70   Ht 165.5 cm (65.16\")   Wt 80.7 kg (178 lb)   BMI 29.48 kg/m²     Body mass index is 29.48 kg/m².           General:   Mental Status:  Alert   Appearance: Cooperative, in no acute distress   Build and Nutrition: Well-nourished well-developed female   Orientation: Alert and oriented to person, place and time   Posture: Normal   Gait: Nonantalgic/normal    Integument:   Right hip: No skin lesions, no rash, no ecchymosis    Neurologic:   Motor:  Right lower extremity: 5/5 quadriceps, hamstrings, ankle dorsiflexors, and ankle plantar flexors    Lower Extremities:   Right " Hip:    Tenderness:  None    Swelling: None    Crepitus:  None    Atrophy:  None    Range of motion:  External Rotation: 30°, no pain       Internal Rotation: 30°, no pain       Flexion:  100°       Extension:  0°   Instability:  None  Deformities:  None  Functional testing: Negative Stinchfield    No leg length discrepancy      Imaging/Studies      Imaging Results (Last 24 Hours)       Procedure Component Value Units Date/Time    XR Hip With or Without Pelvis 2 - 3 View Right [[id number]] Resulted: 04/30/25 0759     Updated: 04/30/25 0759    Narrative:      Right Hip Radiographs  Indication: right hip pain  Views: low AP pelvis and lateral of the right hip    Comparison: no prior studies available for review    Findings:   No acute abnormalities.  No unusual bony features.  Good alignment.              Assessment and Plan     Diagnoses and all orders for this visit:    1. Right hip pain (Primary)  -     XR Hip With or Without Pelvis 2 - 3 View Right  -     MRI Hip Right Without Contrast; Future        1. Right hip pain          I reviewed my findings with the patient.  She has right hip pain, which may be emanating from her back or from the bursa/musculature/labrum.  She does have some known back issues.  Because of the duration of her symptoms and location, I have recommended an MRI for further evaluation.  I will see her back after the MRI for review, but I will be happy to see her back sooner for any problems.    Return for after imaging study.      Mark Berger MD  04/30/25  08:18 EDT      Dictated Utilizing Dragon Dictation

## 2025-05-22 DIAGNOSIS — D25.1 INTRAMURAL LEIOMYOMA OF UTERUS: Primary | ICD-10-CM

## 2025-05-23 ENCOUNTER — OFFICE VISIT (OUTPATIENT)
Dept: OBSTETRICS AND GYNECOLOGY | Facility: CLINIC | Age: 50
End: 2025-05-23
Payer: OTHER GOVERNMENT

## 2025-05-23 VITALS
BODY MASS INDEX: 30.12 KG/M2 | SYSTOLIC BLOOD PRESSURE: 122 MMHG | WEIGHT: 180.8 LBS | DIASTOLIC BLOOD PRESSURE: 80 MMHG | HEIGHT: 65 IN

## 2025-05-23 DIAGNOSIS — D25.2 SUBSEROUS LEIOMYOMA OF UTERUS: Primary | ICD-10-CM

## 2025-05-23 PROCEDURE — 99212 OFFICE O/P EST SF 10 MIN: CPT | Performed by: OBSTETRICS & GYNECOLOGY

## 2025-05-23 RX ORDER — MULTIPLE VITAMINS W/ MINERALS TAB 9MG-400MCG
1 TAB ORAL DAILY
COMMUNITY

## 2025-05-23 NOTE — PROGRESS NOTES
Chief Complaint   Patient presents with    Fibroids    Follow-up         Subjective   HPI  Claudine Coello is a 50 y.o. female, , who presents for follow up evaluation of fibroid and ovarian cysts.    Her last LMP was Patient's last menstrual period was 2025 (exact date)..  She was last seen 1 year(s) ago. At that time the plan was expectant management for management of the fibroid(s). Since her last visit her symptoms have improved. The patient reports additional symptoms as none.      She reports her periods happen regularly every month, lasting 5 days. She reports heavy bleeding with clots for the first 2 days then it tapers off.     Did the patient have u/s today? Yes, <1cm subserosal fibroid, resolution of left ovarian cyst, <3cm right simple v follicular ovarian cyst    Tobacco Usage?: No    Additional OB/GYN History   Last Pap : - PCP- WNL per patient  Last Completed Pap Smear    This patient has no relevant Health Maintenance data.           Current Outpatient Medications:     ALLERGY SERUM INJECTION, Inject  under the skin into the appropriate area as directed 1 (One) Time., Disp: , Rfl:     cholecalciferol (VITAMIN D3) 1.25 MG (11537 UT) capsule, Take 1 capsule by mouth Every 7 (Seven) Days., Disp: , Rfl:     cyclobenzaprine (FLEXERIL) 5 MG tablet, take 1-2 tablet by mouth at bedtime, Disp: , Rfl:     EPINEPHrine (EPIPEN) 0.3 MG/0.3ML solution auto-injector injection, , Disp: , Rfl:     levocetirizine (XYZAL) 5 MG tablet, , Disp: , Rfl:     multivitamin with minerals tablet tablet, Take 1 tablet by mouth Daily., Disp: , Rfl:     ferrous sulfate 325 (65 FE) MG tablet, Take 1 tablet by mouth Every Other Day. (Patient not taking: Reported on 2025), Disp: , Rfl:      Past Medical History:   Diagnosis Date    Anemia 10/2023    Basal cell carcinoma     skin spots    History of varicella as a child 1980    Lumbar back pain     Lumbosacral disc disease     Multiple gestation     Ovarian  "cyst 1999    Discovered fluid filled cyst during 1st trimester exam    Urinary tract infection 2004    Twice in 2004    Uterine fibroid 11/04/2023    3.4 cm right anterior fundal intramural fibroid    Varicella 1980    As a child        Past Surgical History:   Procedure Laterality Date    MANDIBLE SURGERY  2016    and upper jaw    TUBAL ABDOMINAL LIGATION Bilateral 9/2003    After birth of second child    WISDOM TOOTH EXTRACTION  2014       The additional following portions of the patient's history were reviewed and updated as appropriate: allergies, current medications, past family history, past medical history, past social history, past surgical history, and problem list.    Review of Systems   Constitutional: Negative.    HENT: Negative.     Eyes: Negative.    Respiratory: Negative.     Cardiovascular: Negative.    Gastrointestinal: Negative.    Endocrine: Negative.    Genitourinary: Negative.    Musculoskeletal: Negative.    Skin: Negative.    Allergic/Immunologic: Negative.    Neurological: Negative.    Hematological: Negative.    Psychiatric/Behavioral: Negative.       All other systems reviewed and are negative.     I have reviewed and agree with the HPI, ROS, and historical information as entered above. Db Wilkinson MD     /80   Ht 165.1 cm (65\")   Wt 82 kg (180 lb 12.8 oz)   LMP 05/11/2025 (Exact Date)   BMI 30.09 kg/m²     Physical Exam  Constitutional:       Appearance: Normal appearance.   HENT:      Head: Normocephalic and atraumatic.   Pulmonary:      Effort: Pulmonary effort is normal.   Neurological:      General: No focal deficit present.      Mental Status: She is alert.   Psychiatric:         Mood and Affect: Mood normal.         Assessment & Plan     Assessment and Plan    Problem List Items Addressed This Visit       Subserous leiomyoma of uterus - Primary       Fibroid essentially resolved, <1cm  Discussed ovarian physiology   Follow up prn   Declines breast exam today, discussed " importance of clinical breast exams to supplement mammography    Db Wilkinson MD  05/23/2025

## 2025-05-25 ENCOUNTER — HOSPITAL ENCOUNTER (OUTPATIENT)
Facility: HOSPITAL | Age: 50
Discharge: HOME | End: 2025-05-25
Admitting: ORTHOPAEDIC SURGERY
Payer: OTHER GOVERNMENT

## 2025-05-25 DIAGNOSIS — M25.551 RIGHT HIP PAIN: ICD-10-CM

## 2025-05-25 PROCEDURE — 73721 MRI JNT OF LWR EXTRE W/O DYE: CPT

## 2025-06-04 ENCOUNTER — TELEPHONE (OUTPATIENT)
Dept: ORTHOPEDIC SURGERY | Facility: CLINIC | Age: 50
End: 2025-06-04
Payer: OTHER GOVERNMENT

## 2025-06-04 NOTE — TELEPHONE ENCOUNTER
Called patient and lvm to call back to schedule mri follow up with OMAYRA. He has availability at [facility] this Friday 6/6.     HUB OK TO RELAY AND SCHEDULE

## 2025-07-07 ENCOUNTER — OFFICE VISIT (OUTPATIENT)
Dept: ORTHOPEDIC SURGERY | Facility: CLINIC | Age: 50
End: 2025-07-07
Payer: OTHER GOVERNMENT

## 2025-07-07 VITALS
WEIGHT: 181.8 LBS | DIASTOLIC BLOOD PRESSURE: 84 MMHG | BODY MASS INDEX: 30.29 KG/M2 | HEIGHT: 65 IN | SYSTOLIC BLOOD PRESSURE: 132 MMHG

## 2025-07-07 DIAGNOSIS — M25.551 RIGHT HIP PAIN: Primary | ICD-10-CM

## 2025-07-07 PROCEDURE — 99213 OFFICE O/P EST LOW 20 MIN: CPT | Performed by: ORTHOPAEDIC SURGERY

## 2025-07-07 NOTE — PROGRESS NOTES
[facility] [place] [facility]    Subjective     Chief Complaint   Patient presents with    Follow-up     2 month follow up -- Right hip pain -- MRI completed 25        HPI    It has been 2  month(s) since Ms. Coello's last visit. She returns to clinic today for follow-up of right hip pain. The issue has been ongoing for 3 year(s). She rates her pain a 7/10 on the pain scale, which occurs intermittently at that level, but otherwise much less. Previous/current treatments: NSAIDS. Current symptoms: pain. The pain is worse with standing and sitting; resting and ice improve the pain. Overall, she is doing better.  She has been taking Flexeril with improvement.  Here today to review the MRI results.  Pain is located on the lateral aspect of the hip and radiates down to the knee.      I have reviewed the following portions of the patient's history and agree with: History of Present Illness and Review of Systems    Patient Active Problem List   Diagnosis    Subserous leiomyoma of uterus    Abnormal uterine bleeding (AUB)    Left ovarian cyst     Past Medical History:   Diagnosis Date    Anemia 10/2023    Basal cell carcinoma     skin spots    History of varicella as a child     Lumbar back pain     Lumbosacral disc disease     Multiple gestation     Ovarian cyst     Discovered fluid filled cyst during 1st trimester exam    Urinary tract infection 2004    Twice in     Uterine fibroid 2023    3.4 cm right anterior fundal intramural fibroid    Varicella 1980    As a child      Past Surgical History:   Procedure Laterality Date    MANDIBLE SURGERY  2016    and upper jaw    TUBAL ABDOMINAL LIGATION Bilateral 2003    After birth of second child    WISDOM TOOTH EXTRACTION  2014      Family History   Problem Relation Age of Onset    Hypertension Father     Coronary artery disease Father         Twice heart surgery,  of heart attack     Heart attack Father     Diabetes type II Father      Diabetes Father     Hypertension Mother     Coronary artery disease Mother         Stints procedure     Heart attack Mother     Uterine cancer Mother         Hysterectomy 2003    Diabetes type II Mother     Diabetes Mother     Colon polyps Brother     Breast cancer Neg Hx     Ovarian cancer Neg Hx     Colon cancer Neg Hx      Social History     Socioeconomic History    Marital status:    Tobacco Use    Smoking status: Former     Current packs/day: 0.00     Types: Cigarettes     Start date: 1994     Quit date: 1998     Years since quittin.6    Smokeless tobacco: Never    Tobacco comments:     social   Vaping Use    Vaping status: Never Used   Substance and Sexual Activity    Alcohol use: Yes     Alcohol/week: 1.0 standard drink of alcohol     Types: 1 Glasses of wine per week    Drug use: Never    Sexual activity: Yes     Partners: Male     Birth control/protection: Tubal ligation      Current Outpatient Medications on File Prior to Visit   Medication Sig Dispense Refill    ALLERGY SERUM INJECTION Inject  under the skin into the appropriate area as directed 1 (One) Time.      cholecalciferol (VITAMIN D3) 1.25 MG (56907 UT) capsule Take 1 capsule by mouth Every 7 (Seven) Days.      cyclobenzaprine (FLEXERIL) 5 MG tablet take 1-2 tablet by mouth at bedtime      EPINEPHrine (EPIPEN) 0.3 MG/0.3ML solution auto-injector injection       ferrous sulfate 325 (65 FE) MG tablet Take 1 tablet by mouth Every Other Day.      levocetirizine (XYZAL) 5 MG tablet       multivitamin with minerals tablet tablet Take 1 tablet by mouth Daily.       No current facility-administered medications on file prior to visit.      No Known Allergies     Review of Systems   Constitutional:  Negative for activity change, appetite change, chills, diaphoresis, fatigue, fever and unexpected weight change.   HENT:  Negative for congestion, dental problem, drooling, ear discharge, ear pain, facial swelling, hearing loss, mouth  "sores, nosebleeds, postnasal drip, rhinorrhea, sinus pressure, sneezing, sore throat, tinnitus, trouble swallowing and voice change.    Eyes:  Negative for photophobia, pain, discharge, redness, itching and visual disturbance.   Respiratory:  Negative for apnea, cough, choking, chest tightness, shortness of breath, wheezing and stridor.    Cardiovascular:  Negative for chest pain, palpitations and leg swelling.   Gastrointestinal:  Negative for abdominal distention, abdominal pain, anal bleeding, blood in stool, constipation, diarrhea, nausea, rectal pain and vomiting.   Endocrine: Negative for cold intolerance, heat intolerance, polydipsia, polyphagia and polyuria.   Genitourinary:  Negative for decreased urine volume, difficulty urinating, dysuria, enuresis, flank pain, frequency, genital sores, hematuria and urgency.   Musculoskeletal:  Positive for arthralgias. Negative for back pain, gait problem, joint swelling, myalgias, neck pain and neck stiffness.   Skin:  Negative for color change, pallor, rash and wound.   Allergic/Immunologic: Negative for environmental allergies, food allergies and immunocompromised state.   Neurological:  Negative for dizziness, tremors, seizures, syncope, facial asymmetry, speech difficulty, weakness, light-headedness, numbness and headaches.   Hematological:  Negative for adenopathy. Does not bruise/bleed easily.   Psychiatric/Behavioral:  Negative for agitation, behavioral problems, confusion, decreased concentration, dysphoric mood, hallucinations, self-injury, sleep disturbance and suicidal ideas. The patient is not nervous/anxious and is not hyperactive.         Objective      Physical Exam  /84   Ht 165.1 cm (65\")   Wt 82.5 kg (181 lb 12.8 oz)   BMI 30.25 kg/m²     Body mass index is 30.25 kg/m².         General:   Mental Status:  Alert   Appearance: Cooperative, in no acute distress   Build and Nutrition: Well-nourished well-developed female   Orientation: Alert and " oriented to person, place and time   Posture: Normal   Gait: Nonantalgic    Integument:   Right hip: No skin lesions, no rash, no ecchymosis    Lower Extremity:   Right Hip:    Tenderness:  None    Swelling:  None    Crepitus:  None    Range of motion:  External Rotation: 30°, no pain       Internal Rotation: 30°, no pain       Flexion:  100°       Extension:  0°    Deformities:  None  Functional testing: Negative Stinchfield    No leg length discrepancy      Imaging/Studies  Imaging Results (Last 24 Hours)       ** No results found for the last 24 hours. **          MRI HIP RIGHT WO CONTRAST     Date of Exam: 5/25/2025 11:51 AM EDT     Indication: Chronic right hip pain.     Comparison: None available.     Technique:  Routine multiplanar/multisequence images of the right hip were obtained without contrast administration.          FINDINGS:  There is no evidence for fracture or dislocation. There is no evidence for stress injury or stress reaction. No osseous contusions are identified. There is no evidence for osseous necrosis.     The articulations of the hips are intact bilaterally. Osteoarthritic degenerative changes of the right hip are noted with evidence for articular cartilage loss, joint space narrowing, subchondral edema/sclerosis, and osteophytosis. Equivalent findings   are noted involving the contralateral hip. There is no joint effusion. Mild chronic or degenerative type changes of the labrum of the right hip are noted. No displaced labral fragment or flap is seen.     The myotendinous insertions of the right hip and pelvis are grossly intact without avulsion. Changes of tendinopathy are noted. No abnormal fluid collection is seen within the surrounding soft tissues.     No additional abnormal bone marrow signal is seen throughout the remainder of the osseous pelvis. Age-related changes are noted.      No acute abnormalities are seen involving the intraperitoneal soft tissues. Bilateral ovarian cysts  are noted. There is a prominent cyst in the right ovary measuring 3.4 cm. These findings are likely physiologic. Nabothian cysts are also noted in the   cervix.     IMPRESSION:  1.No evidence for fracture or dislocation. No evidence for stress injury or stress reaction. No evidence for osseous necrosis.  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  3.Mild chronic or degenerative type changes of the labrum. No displaced labral fragment or flap is seen.  4.Evidence for chronic tendinopathy involving the myotendinous attachments associated with the hips and pelvis bilaterally. There is no evidence for myotendinous avulsion.        Electronically Signed: Justin Fong MD    5/29/2025 2:05 PM EDT    Workstation ID: [id number]    I reviewed the above images, and agree.  My interpretation is minimal degenerative changes.    Assessment and Plan     Diagnoses and all orders for this visit:    1. Right hip pain (Primary)        1. Right hip pain          I reviewed my findings with the patient.  I recommended continuing with physical therapy and maintenance exercises.  No indications for surgical intervention at this time.  I will see her back if she has any worsening or problems in the future.    Return if symptoms worsen or fail to improve.      Mark Berger MD  07/07/25  08:47 EDT    Dictated Utilizing Dragon Dictation